# Patient Record
Sex: MALE | Race: WHITE | Employment: FULL TIME | URBAN - METROPOLITAN AREA
[De-identification: names, ages, dates, MRNs, and addresses within clinical notes are randomized per-mention and may not be internally consistent; named-entity substitution may affect disease eponyms.]

---

## 2022-09-29 ENCOUNTER — OFFICE VISIT (OUTPATIENT)
Dept: PRIMARY CARE CLINIC | Age: 47
End: 2022-09-29
Payer: COMMERCIAL

## 2022-09-29 VITALS
DIASTOLIC BLOOD PRESSURE: 90 MMHG | WEIGHT: 187 LBS | TEMPERATURE: 98.5 F | SYSTOLIC BLOOD PRESSURE: 143 MMHG | BODY MASS INDEX: 26.77 KG/M2 | HEIGHT: 70 IN | OXYGEN SATURATION: 98 % | HEART RATE: 115 BPM

## 2022-09-29 DIAGNOSIS — K04.7 DENTAL ABSCESS: Primary | ICD-10-CM

## 2022-09-29 PROCEDURE — 99203 OFFICE O/P NEW LOW 30 MIN: CPT | Performed by: NURSE PRACTITIONER

## 2022-09-29 RX ORDER — CLINDAMYCIN HYDROCHLORIDE 300 MG/1
300 CAPSULE ORAL 3 TIMES DAILY
Qty: 21 CAPSULE | Refills: 0 | Status: SHIPPED | OUTPATIENT
Start: 2022-09-29 | End: 2022-10-06

## 2022-09-29 RX ORDER — CHLORHEXIDINE GLUCONATE 0.12 MG/ML
15 RINSE ORAL 2 TIMES DAILY
Qty: 420 ML | Refills: 0 | Status: SHIPPED | OUTPATIENT
Start: 2022-09-29 | End: 2022-10-13

## 2022-09-29 ASSESSMENT — ENCOUNTER SYMPTOMS
SORE THROAT: 0
CHEST TIGHTNESS: 0
VOICE CHANGE: 0
SHORTNESS OF BREATH: 0
FACIAL SWELLING: 1
EYE REDNESS: 0
COUGH: 0
EYE DISCHARGE: 0
SINUS PRESSURE: 0
WHEEZING: 0

## 2022-09-29 ASSESSMENT — PATIENT HEALTH QUESTIONNAIRE - PHQ9
2. FEELING DOWN, DEPRESSED OR HOPELESS: 0
SUM OF ALL RESPONSES TO PHQ QUESTIONS 1-9: 0
SUM OF ALL RESPONSES TO PHQ9 QUESTIONS 1 & 2: 0
SUM OF ALL RESPONSES TO PHQ QUESTIONS 1-9: 0
1. LITTLE INTEREST OR PLEASURE IN DOING THINGS: 0

## 2022-09-29 NOTE — PROGRESS NOTES
[address] [facility]  [address] [place] [address]  Dept: [phone]  Dept Fax: [phone]     Olga Delgado is a 49 y.o. male who presents to the urgent care today for his medicalconditions/complaints as noted below. Olga Delgado is c/o of Dental Pain (Bottom rt side with swelling - started yesterday)    HPI:      Dental Pain   This is a new problem. The current episode started yesterday. The problem occurs constantly. The problem has been rapidly worsening. Associated symptoms include facial pain (right side of mouth) and thermal sensitivity. Pertinent negatives include no fever or sinus pressure. He has tried acetaminophen for the symptoms. The treatment provided mild relief. History reviewed. No pertinent past medical history. Current Outpatient Medications   Medication Sig Dispense Refill    clindamycin (CLEOCIN) 300 MG capsule Take 1 capsule by mouth 3 times daily for 7 days 21 capsule 0    chlorhexidine (PERIDEX) 0.12 % solution Take 15 mLs by mouth 2 times daily for 14 days 420 mL 0     No current facility-administered medications for this visit. Allergies   Allergen Reactions    Amoxil [Amoxicillin] Other (See Comments)     Not aware of reaction - was told by mother not to take      Reviewed PMH, SH, and FH with the patient and updated. Subjective:      Review of Systems   Constitutional:  Negative for chills, fatigue and fever. HENT:  Positive for dental problem (right lower) and facial swelling (right lower jaw). Negative for congestion, ear discharge, ear pain, postnasal drip, sinus pressure, sneezing, sore throat and voice change. Eyes:  Negative for discharge and redness. Respiratory:  Negative for cough, chest tightness, shortness of breath and wheezing. Cardiovascular: Negative. Negative for chest pain. Musculoskeletal:  Negative for myalgias. Skin:  Negative for rash. Neurological:  Positive for headaches (mild). Negative for dizziness, weakness and light-headedness. Hematological:  Negative for adenopathy. All other systems reviewed and are negative. Objective:      Physical Exam  Vitals and nursing note reviewed. Constitutional:       General: He is not in acute distress. Appearance: Normal appearance. He is well-developed. He is not ill-appearing, toxic-appearing or diaphoretic. HENT:      Head: Normocephalic. Right Ear: Tympanic membrane and external ear normal.      Left Ear: Tympanic membrane and external ear normal.      Nose: Nose normal.      Right Sinus: No maxillary sinus tenderness or frontal sinus tenderness. Left Sinus: No maxillary sinus tenderness or frontal sinus tenderness. Mouth/Throat:      Dentition: Abnormal dentition. Gingival swelling, dental caries and dental abscesses present. Pharynx: No oropharyngeal exudate or posterior oropharyngeal erythema. Eyes:      General:         Right eye: No discharge. Left eye: No discharge. Cardiovascular:      Rate and Rhythm: Normal rate and regular rhythm. Heart sounds: Normal heart sounds. No murmur heard. Pulmonary:      Effort: Pulmonary effort is normal. No respiratory distress. Breath sounds: Normal breath sounds. No wheezing or rales. Lymphadenopathy:      Cervical: No cervical adenopathy. Skin:     General: Skin is warm. Findings: No rash. Neurological:      Mental Status: He is alert. Psychiatric:         Speech: Speech is rapid and pressured. BP (!) 143/90   Pulse (!) 115   Temp 98.5 °F (36.9 °C) (Oral)   Ht 5' 10\" (1.778 m)   Wt 187 lb (84.8 kg)   SpO2 98%   BMI 26.83 kg/m²     Assessment:       Diagnosis Orders   1.  Dental abscess  clindamycin (CLEOCIN) 300 MG capsule    chlorhexidine (PERIDEX) 0.12 % solution        Plan:      Based on the physical exam findings and reported symptoms-- I will treat this as a dental abscess at this time. I recommend Clindamycin TID x 7 days. Patient instructed to complete antibiotic prescription fully. May use Motrin/Tylenol for fever/pain. Peridex swishes encouraged. Patient agreeable to treatment plan. Educational materials provided on AVS.  Follow up with dentist for further treatment and dental care. Orders Placed This Encounter   Medications    clindamycin (CLEOCIN) 300 MG capsule     Sig: Take 1 capsule by mouth 3 times daily for 7 days     Dispense:  21 capsule     Refill:  0    chlorhexidine (PERIDEX) 0.12 % solution     Sig: Take 15 mLs by mouth 2 times daily for 14 days     Dispense:  420 mL     Refill:  0        Patient given educational materials - see patient instructions. Discussed use, benefit, and side effects of prescribed medications. All patientquestions answered. Pt voiced understanding.     Electronically signed by KOFFI Barrera CNP on 9/29/2022at 1:35 PM

## 2023-11-24 ENCOUNTER — APPOINTMENT (OUTPATIENT)
Dept: GENERAL RADIOLOGY | Age: 48
End: 2023-11-24
Payer: COMMERCIAL

## 2023-11-24 ENCOUNTER — HOSPITAL ENCOUNTER (EMERGENCY)
Age: 48
Discharge: HOME | End: 2023-11-24
Attending: EMERGENCY MEDICINE
Payer: COMMERCIAL

## 2023-11-24 VITALS
HEIGHT: 70 IN | BODY MASS INDEX: 27.2 KG/M2 | DIASTOLIC BLOOD PRESSURE: 97 MMHG | TEMPERATURE: 98.1 F | HEART RATE: 84 BPM | SYSTOLIC BLOOD PRESSURE: 152 MMHG | OXYGEN SATURATION: 100 % | WEIGHT: 190 LBS | RESPIRATION RATE: 18 BRPM

## 2023-11-24 DIAGNOSIS — M25.511 RIGHT SHOULDER PAIN, UNSPECIFIED CHRONICITY: Primary | ICD-10-CM

## 2023-11-24 PROCEDURE — 73030 X-RAY EXAM OF SHOULDER: CPT

## 2023-11-24 PROCEDURE — 99284 EMERGENCY DEPT VISIT MOD MDM: CPT

## 2023-11-24 PROCEDURE — 96372 THER/PROPH/DIAG INJ SC/IM: CPT

## 2023-11-24 PROCEDURE — 6370000000 HC RX 637 (ALT 250 FOR IP): Performed by: NURSE PRACTITIONER

## 2023-11-24 PROCEDURE — 6360000002 HC RX W HCPCS: Performed by: NURSE PRACTITIONER

## 2023-11-24 RX ORDER — NAPROXEN 500 MG/1
500 TABLET ORAL 2 TIMES DAILY PRN
Qty: 14 TABLET | Refills: 0 | Status: SHIPPED | OUTPATIENT
Start: 2023-11-24

## 2023-11-24 RX ORDER — METAXALONE 800 MG/1
800 TABLET ORAL ONCE
Status: COMPLETED | OUTPATIENT
Start: 2023-11-24 | End: 2023-11-24

## 2023-11-24 RX ORDER — KETOROLAC TROMETHAMINE 30 MG/ML
30 INJECTION, SOLUTION INTRAMUSCULAR; INTRAVENOUS ONCE
Status: COMPLETED | OUTPATIENT
Start: 2023-11-24 | End: 2023-11-24

## 2023-11-24 RX ORDER — METAXALONE 800 MG/1
800 TABLET ORAL 3 TIMES DAILY PRN
Qty: 15 TABLET | Refills: 0 | Status: SHIPPED | OUTPATIENT
Start: 2023-11-24

## 2023-11-24 RX ORDER — HYDROCODONE BITARTRATE AND ACETAMINOPHEN 5; 325 MG/1; MG/1
1 TABLET ORAL EVERY 8 HOURS PRN
Qty: 9 TABLET | Refills: 0 | Status: SHIPPED | OUTPATIENT
Start: 2023-11-24 | End: 2023-11-27

## 2023-11-24 RX ADMIN — KETOROLAC TROMETHAMINE 30 MG: 30 INJECTION, SOLUTION INTRAMUSCULAR at 12:57

## 2023-11-24 RX ADMIN — METAXALONE 800 MG: 800 TABLET ORAL at 12:57

## 2023-11-24 ASSESSMENT — ENCOUNTER SYMPTOMS
COLOR CHANGE: 0
SHORTNESS OF BREATH: 0
BACK PAIN: 0

## 2023-11-24 ASSESSMENT — PAIN SCALES - GENERAL
PAINLEVEL_OUTOF10: 4
PAINLEVEL_OUTOF10: 0
PAINLEVEL_OUTOF10: 2

## 2023-11-24 ASSESSMENT — PAIN DESCRIPTION - LOCATION
LOCATION: SHOULDER

## 2023-11-24 ASSESSMENT — PAIN - FUNCTIONAL ASSESSMENT
PAIN_FUNCTIONAL_ASSESSMENT: 0-10
PAIN_FUNCTIONAL_ASSESSMENT: 0-10

## 2023-11-24 ASSESSMENT — PAIN DESCRIPTION - FREQUENCY: FREQUENCY: CONTINUOUS

## 2023-11-24 ASSESSMENT — PAIN DESCRIPTION - DESCRIPTORS: DESCRIPTORS: ACHING;SHARP

## 2023-11-24 ASSESSMENT — PAIN DESCRIPTION - ORIENTATION
ORIENTATION: RIGHT
ORIENTATION: RIGHT

## 2023-11-24 NOTE — DISCHARGE INSTRUCTIONS
Skelaxin and/or norco:  WARNING:  May cause drowsiness. May impair ability to operate vehicles or machinery. Do not use in combination with alcohol.